# Patient Record
Sex: FEMALE | Race: WHITE | Employment: FULL TIME | URBAN - METROPOLITAN AREA
[De-identification: names, ages, dates, MRNs, and addresses within clinical notes are randomized per-mention and may not be internally consistent; named-entity substitution may affect disease eponyms.]

---

## 2018-04-06 ENCOUNTER — HOSPITAL ENCOUNTER (EMERGENCY)
Age: 48
Discharge: HOME | End: 2018-04-06
Attending: EMERGENCY MEDICINE
Payer: COMMERCIAL

## 2018-04-06 VITALS
HEART RATE: 58 BPM | DIASTOLIC BLOOD PRESSURE: 72 MMHG | OXYGEN SATURATION: 98 % | SYSTOLIC BLOOD PRESSURE: 127 MMHG | RESPIRATION RATE: 16 BRPM

## 2018-04-06 DIAGNOSIS — R07.9 CHEST PAIN, UNSPECIFIED TYPE: Primary | ICD-10-CM

## 2018-04-06 LAB
TROPONIN I BLD-MCNC: 0 NG/ML (ref 0.02–0.05)
TROPONIN I BLD-MCNC: 0 NG/ML (ref 0.02–0.05)

## 2018-04-06 PROCEDURE — 99285 EMERGENCY DEPT VISIT HI MDM: CPT | Performed by: EMERGENCY MEDICINE

## 2018-04-06 PROCEDURE — 74011250637 HC RX REV CODE- 250/637: Performed by: EMERGENCY MEDICINE

## 2018-04-06 PROCEDURE — 93005 ELECTROCARDIOGRAM TRACING: CPT | Performed by: EMERGENCY MEDICINE

## 2018-04-06 PROCEDURE — 84484 ASSAY OF TROPONIN QUANT: CPT

## 2018-04-06 RX ORDER — PHENOL/SODIUM PHENOLATE
20 AEROSOL, SPRAY (ML) MUCOUS MEMBRANE 2 TIMES DAILY
Qty: 60 TAB | Refills: 0 | Status: SHIPPED | OUTPATIENT
Start: 2018-04-06

## 2018-04-06 RX ORDER — GUAIFENESIN 100 MG/5ML
324 LIQUID (ML) ORAL
Status: COMPLETED | OUTPATIENT
Start: 2018-04-06 | End: 2018-04-06

## 2018-04-06 RX ADMIN — Medication 30 ML: at 14:11

## 2018-04-06 RX ADMIN — ASPIRIN 81 MG 324 MG: 81 TABLET ORAL at 14:17

## 2018-04-06 NOTE — ED PROVIDER NOTES
HPI Comments: 55-year-old female presents to the emergency department with chest pain and left shoulder. Started after eating corn dogs for lunch. No alleviating. No aggravating. No shortness of breath no nausea or vomiting    No fever. No abdomen or back pain. No dysuria. Patient had laparoscopic cholecystectomy several years ago    Patient is a 53 y.o. female presenting with chest pain. Chest Pain (Angina)    Pertinent negatives include no fever, no nausea, no shortness of breath and no vomiting. Past Medical History:   Diagnosis Date    Arrhythmia     occ pvc's and pvc's, systolic murmur    Arthritis     r.a. --dx at age 7    CAD (Coronary Artery Disease)     mvp--- with murmur- last echo     Hypertension     labile hypertension    IBS (Irritable Bowel Syndrome)     diarrhea    Irritable Bowel Syndrome with Diarrhea     Migraines     treats with procardia    Other Ill-Defined Conditions     h/o migraines       Past Surgical History:   Procedure Laterality Date    HX  SECTION      HX HEENT      wisdom teeth removed    HX HEENT      lasik eye- bilat    HX TONSILLECTOMY      HX TUBAL LIGATION      and reversal         Family History:   Problem Relation Age of Onset    Hypertension Maternal Grandmother     Heart Disease Paternal Grandmother     Stroke Paternal Grandmother        Social History     Social History    Marital status:      Spouse name: N/A    Number of children: N/A    Years of education: N/A     Occupational History    Not on file.      Social History Main Topics    Smoking status: Former Smoker     Packs/day: 2.00     Years: 15.00    Smokeless tobacco: Not on file      Comment: quit 12 yrs ago    Alcohol use Yes      Comment: rare    Drug use: No    Sexual activity: Not on file     Other Topics Concern    Not on file     Social History Narrative    No narrative on file         ALLERGIES: Adhesive tape    Review of Systems   Constitutional: Negative for activity change, chills and fever. HENT: Negative. Eyes: Negative. Respiratory: Negative for shortness of breath. Cardiovascular: Positive for chest pain. Gastrointestinal: Negative for nausea and vomiting. Genitourinary: Negative. Musculoskeletal: Negative. Skin: Negative. Neurological: Negative. Psychiatric/Behavioral: Negative. Vitals:    18 1339   BP: 157/74   Pulse: 62   Resp: 16   SpO2: 98%            Physical Exam   Constitutional: She is oriented to person, place, and time. She appears well-developed and well-nourished. No distress. HENT:   Head: Atraumatic. Eyes: EOM are normal. Pupils are equal, round, and reactive to light. Cardiovascular: Normal rate and regular rhythm. Pulmonary/Chest: Breath sounds normal. No respiratory distress. She has no wheezes. Abdominal: Soft. She exhibits no distension. There is no tenderness. Musculoskeletal: Normal range of motion. Neurological: She is alert and oriented to person, place, and time. Skin: Skin is dry. Psychiatric: She has a normal mood and affect. Her behavior is normal.   Nursing note and vitals reviewed. MDM  Number of Diagnoses or Management Options  Chest pain, unspecified type:   Diagnosis management comments: Well-appearing 67-year-old female presents with chest pain and some pain in her shoulder    No diaphoresis. No shortness of breath. No nausea    She is not a smoker. Not diabetic. No history of coronary disease    HEART Score Criteria:  History:  0  Highly suspicious           2  Moderately suspicious         1  Slightly suspicious           0  EC  Significant ST-depression                          2  Nonspecific repolarization disturbance  1  Normal      0  Age:  3 > 72years old             3 44-72 years old  3 < 39years old   0  Risk Factors: 0  Risk factors: hypercholesterolemia, hypertension, diabetes, cigarette smoking, family history, obesity.   3 or more risk factors   2  1-2 risk factors  1  No risk factors   0  Troponin: 0  > 3x normal limit  2  1-3x normal limit  1  < normal limit   0  Total HEART Score: 1    Score: 0-3 Low risk           4-6 Moderate risk                 7-10 high risk   Applying the HEART score criteria above, the patient is determined to have a low risk for a major cardiac event within the next 6 weeks, and therefore will be discharged. Patient with very low risk story for cardiac disease. EKG negative. Troponin negative    We will get a 2 hour troponin and EKG. As long as those are negative she'll go home    Patient appears well    Start her on a PPI. Refer to primary care. Yassine Rashid MD; 4/6/2018 @2:58 PM===========================================         Amount and/or Complexity of Data Reviewed  Clinical lab tests: reviewed          ED Course   4:36 PM  I followed up on patient for Dr. Lashawn Mayfield after shift change. Her repeat troponin was 0 and EKG remain normal.  She will be discharged to home with his previous instructions.     Procedures

## 2018-04-06 NOTE — DISCHARGE INSTRUCTIONS
Chest Pain: Care Instructions  Your Care Instructions    There are many things that can cause chest pain. Some are not serious and will get better on their own in a few days. But some kinds of chest pain need more testing and treatment. Your doctor may have recommended a follow-up visit in the next 8 to 12 hours. If you are not getting better, you may need more tests or treatment. Even though your doctor has released you, you still need to watch for any problems. The doctor carefully checked you, but sometimes problems can develop later. If you have new symptoms or if your symptoms do not get better, get medical care right away. If you have worse or different chest pain or pressure that lasts more than 5 minutes or you passed out (lost consciousness), call 911 or seek other emergency help right away. A medical visit is only one step in your treatment. Even if you feel better, you still need to do what your doctor recommends, such as going to all suggested follow-up appointments and taking medicines exactly as directed. This will help you recover and help prevent future problems. How can you care for yourself at home? · Rest until you feel better. · Take your medicine exactly as prescribed. Call your doctor if you think you are having a problem with your medicine. · Do not drive after taking a prescription pain medicine. When should you call for help? Call 911 if:  ? · You passed out (lost consciousness). ? · You have severe difficulty breathing. ? · You have symptoms of a heart attack. These may include:  ¨ Chest pain or pressure, or a strange feeling in your chest.  ¨ Sweating. ¨ Shortness of breath. ¨ Nausea or vomiting. ¨ Pain, pressure, or a strange feeling in your back, neck, jaw, or upper belly or in one or both shoulders or arms. ¨ Lightheadedness or sudden weakness. ¨ A fast or irregular heartbeat.   After you call 911, the  may tell you to chew 1 adult-strength or 2 to 4 low-dose aspirin. Wait for an ambulance. Do not try to drive yourself. ?Call your doctor today if:  ? · You have any trouble breathing. ? · Your chest pain gets worse. ? · You are dizzy or lightheaded, or you feel like you may faint. ? · You are not getting better as expected. ? · You are having new or different chest pain. Where can you learn more? Go to http://reymundo-marcelo.info/. Enter A120 in the search box to learn more about \"Chest Pain: Care Instructions. \"  Current as of: March 20, 2017  Content Version: 11.4  © 8158-8784 [employer] Care instructions adapted under license by [employer] (which disclaims liability or warranty for this information). If you have questions about a medical condition or this instruction, always ask your healthcare professional. [address] any warranty or liability for your use of this information.

## 2018-04-06 NOTE — ED NOTES
I have reviewed discharge instructions with the patient. The patient verbalized understanding. Patient left ED via Discharge Method: wheelchair to Home with self    Opportunity for questions and clarification provided. Patient given 0 scripts. To continue your aftercare when you leave the hospital, you may receive an automated call from our care team to check in on how you are doing. This is a free service and part of our promise to provide the best care and service to meet your aftercare needs.  If you have questions, or wish to unsubscribe from this service please call [phone]. Thank you for Choosing our [facility] Emergency Department.

## 2018-04-07 LAB
ATRIAL RATE: 52 BPM
ATRIAL RATE: 63 BPM
CALCULATED P AXIS, ECG09: 59 DEGREES
CALCULATED P AXIS, ECG09: 70 DEGREES
CALCULATED R AXIS, ECG10: 83 DEGREES
CALCULATED R AXIS, ECG10: 83 DEGREES
CALCULATED T AXIS, ECG11: 67 DEGREES
CALCULATED T AXIS, ECG11: 70 DEGREES
DIAGNOSIS, 93000: NORMAL
DIAGNOSIS, 93000: NORMAL
P-R INTERVAL, ECG05: 166 MS
P-R INTERVAL, ECG05: 170 MS
Q-T INTERVAL, ECG07: 400 MS
Q-T INTERVAL, ECG07: 434 MS
QRS DURATION, ECG06: 80 MS
QRS DURATION, ECG06: 80 MS
QTC CALCULATION (BEZET), ECG08: 403 MS
QTC CALCULATION (BEZET), ECG08: 409 MS
VENTRICULAR RATE, ECG03: 52 BPM
VENTRICULAR RATE, ECG03: 63 BPM

## 2021-04-20 PROCEDURE — 88305 TISSUE EXAM BY PATHOLOGIST: CPT

## 2021-04-20 PROCEDURE — 88312 SPECIAL STAINS GROUP 1: CPT

## 2021-04-21 ENCOUNTER — HOSPITAL ENCOUNTER (OUTPATIENT)
Dept: LAB | Age: 51
Discharge: HOME | End: 2021-04-21

## 2022-07-19 LAB
CHOLEST SERPL-MCNC: 157 MG/DL
GLUCOSE SERPL-MCNC: 128 MG/DL (ref 65–100)
HDLC SERPL-MCNC: 52 MG/DL (ref 40–60)
LDLC SERPL CALC-MCNC: 70 MG/DL
TRIGL SERPL-MCNC: 175 MG/DL (ref 35–150)

## 2023-06-22 ENCOUNTER — HOSPITAL ENCOUNTER (OUTPATIENT)
Dept: PHYSICAL THERAPY | Age: 53
Setting detail: RECURRING SERIES
Discharge: HOME | End: 2023-06-25
Payer: COMMERCIAL

## 2023-06-22 PROCEDURE — 97161 PT EVAL LOW COMPLEX 20 MIN: CPT

## 2023-06-22 ASSESSMENT — PAIN SCALES - GENERAL: PAINLEVEL_OUTOF10: 3

## 2023-06-30 ENCOUNTER — HOSPITAL ENCOUNTER (OUTPATIENT)
Dept: PHYSICAL THERAPY | Age: 53
Setting detail: RECURRING SERIES
End: 2023-06-30
Payer: COMMERCIAL

## 2023-06-30 PROCEDURE — 97140 MANUAL THERAPY 1/> REGIONS: CPT

## 2023-06-30 PROCEDURE — 97110 THERAPEUTIC EXERCISES: CPT

## 2023-06-30 ASSESSMENT — PAIN SCALES - GENERAL: PAINLEVEL_OUTOF10: 3

## 2023-07-07 ENCOUNTER — HOSPITAL ENCOUNTER (OUTPATIENT)
Dept: PHYSICAL THERAPY | Age: 53
Setting detail: RECURRING SERIES
Discharge: HOME | End: 2023-07-10
Payer: COMMERCIAL

## 2023-07-07 PROCEDURE — 97530 THERAPEUTIC ACTIVITIES: CPT

## 2023-07-07 PROCEDURE — 97110 THERAPEUTIC EXERCISES: CPT

## 2023-07-07 PROCEDURE — 97140 MANUAL THERAPY 1/> REGIONS: CPT

## 2023-07-07 ASSESSMENT — PAIN SCALES - GENERAL: PAINLEVEL_OUTOF10: 4

## 2023-07-14 ENCOUNTER — HOSPITAL ENCOUNTER (OUTPATIENT)
Dept: PHYSICAL THERAPY | Age: 53
Setting detail: RECURRING SERIES
Discharge: HOME | End: 2023-07-17
Payer: COMMERCIAL

## 2023-07-14 PROCEDURE — 97140 MANUAL THERAPY 1/> REGIONS: CPT

## 2023-07-14 ASSESSMENT — PAIN SCALES - GENERAL: PAINLEVEL_OUTOF10: 3

## 2023-07-21 ENCOUNTER — HOSPITAL ENCOUNTER (OUTPATIENT)
Dept: PHYSICAL THERAPY | Age: 53
Setting detail: RECURRING SERIES
Discharge: HOME | End: 2023-07-24
Payer: COMMERCIAL

## 2023-07-21 PROCEDURE — 97140 MANUAL THERAPY 1/> REGIONS: CPT

## 2023-07-21 ASSESSMENT — PAIN SCALES - GENERAL: PAINLEVEL_OUTOF10: 2

## 2023-07-21 NOTE — PROGRESS NOTES
Lourdes Mckeon  : 1970  Primary: Susie Reagan ([facility])  Secondary:  [address] @ [employer]  [address]   [address] [facility] [address]  Phone: [phone]  Fax: [phone] Plan Frequency: 1 time a week for 4 weeks then every other week as needed for 8 weeks  Plan of Care/Certification Expiration Date: 23      >PT Visit Info:  Plan Frequency: 1 time a week for 4 weeks then every other week as needed for 8 weeks  Plan of Care/Certification Expiration Date: 23      Visit Count:  5    OUTPATIENT PHYSICAL THERAPY:OP NOTE TYPE: Treatment Note 2023       Episode  }Appt Desk             Treatment Diagnosis:  Cervicalgia (M54.2)  Arthralgia of temporomandibular joint, M26.61  Medical/Referring Diagnosis:  Unspecified temporomandibular joint disorder, unspecified side [M26.609]  Referring Physician:  DANIE Toure NP, MD Orders:  PT Eval and Treat   Date of Onset:  Onset Date:  (greater than 1 year)     Allergies:   Adhesive tape  Restrictions/Precautions:  No data recordedNo data recorded     Interventions Planned (Treatment may consist of any combination of the following):    Current Treatment Recommendations: Strengthening; ROM; Neuromuscular re-education; Manual; Modalities; Dry needling     >Subjective Comments:Patient reports that she has not worn the splint and feels a little better this week with less popping. She did have a fatigue feeling after eating a burrito the other day  >Initial:     2/10>Post Session:       1/10  Medications Last Reviewed:  2023  Updated Objective Findings:    Treatment   THERAPEUTIC EXERCISE: (0 minutes):    Exercises per grid below to improve mobility, strength, and coordination. Required minimal visual, verbal, manual, and tactile cues to promote proper body alignment, promote proper body posture, and promote proper body mechanics. Progressed resistance, range, and repetitions as indicated.    Date:  23

## 2023-08-07 ENCOUNTER — HOSPITAL ENCOUNTER (OUTPATIENT)
Dept: PHYSICAL THERAPY | Age: 53
Setting detail: RECURRING SERIES
Discharge: HOME | End: 2023-08-10
Payer: COMMERCIAL

## 2023-08-07 PROCEDURE — 97140 MANUAL THERAPY 1/> REGIONS: CPT

## 2023-08-07 ASSESSMENT — PAIN SCALES - GENERAL: PAINLEVEL_OUTOF10: 2

## 2023-08-07 NOTE — PROGRESS NOTES
Ann-Marie Randy  : 1970  Primary: Conrad Will Sc ([facility])  Secondary:  [address] @ [employer]  [address]   [address] [facility] [address]  Phone: [phone]  Fax: [phone] Plan Frequency: 1 time a week for 4 weeks then every other week as needed for 8 weeks  Plan of Care/Certification Expiration Date: 23      >PT Visit Info:  Plan Frequency: 1 time a week for 4 weeks then every other week as needed for 8 weeks  Plan of Care/Certification Expiration Date: 23      Visit Count:  6    OUTPATIENT PHYSICAL THERAPY:OP NOTE TYPE: Treatment Note 2023       Episode  }Appt Desk             Treatment Diagnosis:  Cervicalgia (M54.2)  Arthralgia of temporomandibular joint, M26.61  Medical/Referring Diagnosis:  Unspecified temporomandibular joint disorder, unspecified side [M26.609]  Referring Physician:  DANIE Villarreal NP, MD Orders:  PT Eval and Treat   Date of Onset:  Onset Date:  (greater than 1 year)     Allergies:   Adhesive tape  Restrictions/Precautions:  No data recordedNo data recorded     Interventions Planned (Treatment may consist of any combination of the following):    Current Treatment Recommendations: Strengthening; ROM; Neuromuscular re-education; Manual; Modalities; Dry needling     >Subjective Comments:Patient reports that her jaw was doing good till she got a baguette sandwich last week and was locked for about 5 days. It has calmed down now, but was stuck for a little bit  >Initial:     2/10>Post Session:       1/10  Medications Last Reviewed:  2023  Updated Objective Findings:  More symmetrical opening today  Treatment   THERAPEUTIC EXERCISE: (0 minutes):    Exercises per grid below to improve mobility, strength, and coordination. Required minimal visual, verbal, manual, and tactile cues to promote proper body alignment, promote proper body posture, and promote proper body mechanics.   Progressed resistance, range, and repetitions as

## 2023-08-25 ENCOUNTER — HOSPITAL ENCOUNTER (OUTPATIENT)
Dept: PHYSICAL THERAPY | Age: 53
Setting detail: RECURRING SERIES
Discharge: HOME | End: 2023-08-28
Payer: COMMERCIAL

## 2023-08-25 PROCEDURE — 97110 THERAPEUTIC EXERCISES: CPT

## 2023-08-25 PROCEDURE — 97140 MANUAL THERAPY 1/> REGIONS: CPT

## 2023-08-25 ASSESSMENT — PAIN SCALES - GENERAL: PAINLEVEL_OUTOF10: 0

## 2023-08-25 NOTE — PROGRESS NOTES
Talisha Perez  : 1970  Primary: Sarita  Sc ([facility])  Secondary:  [address] [employer]  [address]   [address] [facility] [address]  Phone: [phone]  Fax: [phone] Plan Frequency: 1 time a week for 4 weeks then every other week as needed for 8 weeks  Plan of Care/Certification Expiration Date: 23      >PT Visit Info:  Plan Frequency: 1 time a week for 4 weeks then every other week as needed for 8 weeks  Plan of Care/Certification Expiration Date: 23      Visit Count:  7    OUTPATIENT PHYSICAL THERAPY:OP NOTE TYPE: Treatment Note 2023       Episode  }Appt Desk             Treatment Diagnosis:  Cervicalgia (M54.2)  Arthralgia of temporomandibular joint, M26.61  Medical/Referring Diagnosis:  Unspecified temporomandibular joint disorder, unspecified side [M26.609]  Referring Physician:  DANIE Cates NP, MD Orders:  PT Eval and Treat   Date of Onset:  Onset Date:  (greater than 1 year)     Allergies:   Adhesive tape  Restrictions/Precautions:  No data recordedNo data recorded     Interventions Planned (Treatment may consist of any combination of the following):    Current Treatment Recommendations: Strengthening; ROM; Neuromuscular re-education; Manual; Modalities; Dry needling     >Subjective Comments:Patient reports that she is doing pretty good and managing to not do too much with bigger bites of food. She feels like it is progressing, but really slowly. Less popping lately  >Initial:     0/10>Post Session:       0/10  Medications Last Reviewed:  2023  Updated Objective Findings:  More symmetrical opening today  Treatment   THERAPEUTIC EXERCISE: (10 minutes):    Exercises per grid below to improve mobility, strength, and coordination. Required minimal visual, verbal, manual, and tactile cues to promote proper body alignment, promote proper body posture, and promote proper body mechanics.   Progressed resistance, range, and repetitions as

## 2023-09-20 ENCOUNTER — HOSPITAL ENCOUNTER (OUTPATIENT)
Dept: PHYSICAL THERAPY | Age: 53
Setting detail: RECURRING SERIES
Discharge: HOME | End: 2023-09-23
Payer: COMMERCIAL

## 2023-09-20 PROCEDURE — 97140 MANUAL THERAPY 1/> REGIONS: CPT

## 2023-09-20 ASSESSMENT — PAIN SCALES - GENERAL: PAINLEVEL_OUTOF10: 0

## 2023-09-20 NOTE — THERAPY DISCHARGE
Robyn Castorena  : 1970  Primary: Santosh Jessica Sc ([facility])  Secondary:  [address] [employer]  [address]   [address] [facility] [address]  Phone: [phone]  Fax: [phone] Plan Frequency: 1 time a week for 4 weeks then every other week as needed for 8 weeks    Plan of Care/Certification Expiration Date: 23      PT Visit Info:  Plan Frequency: 1 time a week for 4 weeks then every other week as needed for 8 weeks  Plan of Care/Certification Expiration Date: 23      Visit Count:  8                OUTPATIENT PHYSICAL THERAPY:             OP NOTE TYPE: Discharge Summary 2023               Episode (TMD) Appt Desk         Treatment Diagnosis:  Cervicalgia (M54.2)  Arthralgia of temporomandibular joint, M26.61  Medical/Referring Diagnosis:  Unspecified temporomandibular joint disorder, unspecified side [M26.609]  Referring Physician:  DANIE Uriarte NP, MD Orders:  PT Eval and Treat   Return MD Appt:  not scheduled  Date of Onset:  Onset Date:  (greater than 1 year)      Allergies:  Adhesive tape  Restrictions/Precautions:           Medications Last Reviewed:  2023     SUBJECTIVE   History of Injury/Illness (Reason for Referral):  Patient reports that her jaw pain started over a year ago. She started to feel pain and discomfort with yawning or chewing or talking sometimes. The pain feels stiff in the mornings and more with teeth brushing. She has felt the right jaw get stuck a few times over the last month or two with eating. She does have a history of migraines.  She has an over the counter  for grinding teeth that did help for a while  Patient Stated Goal(s):  \"to fix her jaw and not have it lock\"  Initial:     010 Post Session:     010  Past Medical History/Comorbidities:   Ms. Yusuf Gamboa  has a past medical history of Arrhythmia, Arthritis, CAD (coronary artery disease), Hypertension, IBS (irritable bowel syndrome), Irritable bowel

## 2023-09-20 NOTE — PROGRESS NOTES
Lourdes Mckeon  : 1970  Primary: Susie Reagan ([facility])  Secondary:  [address] [employer]  [address]   [address] [facility] [address]  Phone: [phone]  Fax: [phone] Plan Frequency: 1 time a week for 4 weeks then every other week as needed for 8 weeks  Plan of Care/Certification Expiration Date: 23      >PT Visit Info:  Plan Frequency: 1 time a week for 4 weeks then every other week as needed for 8 weeks  Plan of Care/Certification Expiration Date: 23      Visit Count:  8    OUTPATIENT PHYSICAL THERAPY:OP NOTE TYPE: Treatment Note 2023       Episode  }Appt Desk             Treatment Diagnosis:  Cervicalgia (M54.2)  Arthralgia of temporomandibular joint, M26.61  Medical/Referring Diagnosis:  Unspecified temporomandibular joint disorder, unspecified side [M26.609]  Referring Physician:  DANIE Toure NP, MD Orders:  PT Eval and Treat   Date of Onset:  Onset Date:  (greater than 1 year)     Allergies:   Adhesive tape  Restrictions/Precautions:  No data recordedNo data recorded     Interventions Planned (Treatment may consist of any combination of the following):    Current Treatment Recommendations: Strengthening; ROM; Neuromuscular re-education; Manual; Modalities; Dry needling     >Subjective Comments:Patient reports that she is doing well and not thinking about it as much. She feels it sometimes and feels it lock still with bigger bites of food, but can get it back in alignment when needed  >Initial:     0/10>Post Session:       0/10  Medications Last Reviewed:  2023  Updated Objective Findings:  More symmetrical opening today  Treatment   THERAPEUTIC EXERCISE: (0 minutes):    Exercises per grid below to improve mobility, strength, and coordination. Required minimal visual, verbal, manual, and tactile cues to promote proper body alignment, promote proper body posture, and promote proper body mechanics.   Progressed resistance, range, and

## 2024-04-01 ENCOUNTER — TELEPHONE (OUTPATIENT)
Dept: ORTHOPEDIC SURGERY | Age: 54
End: 2024-04-01

## 2024-04-01 NOTE — TELEPHONE ENCOUNTER
Patient is calling about a 2nd opinion about her back.  She has a hemangioma at T10 found by MRI at [facility].  She sees a dr at [facility].  Informed patient to bring her records from [place], any PT or injection records, a copy of the MRI disc to upload with the MRI report so that Dr. Luna can review the records.  I also informed her that once Dr. Luna reviews the records, I will call her back.  Patient stated understanding and will bring the records and drop off at [address] and ask the  to forward to me to send for review with Dr. Luna.

## 2024-04-02 ENCOUNTER — TELEPHONE (OUTPATIENT)
Dept: ORTHOPEDIC SURGERY | Age: 54
End: 2024-04-02

## 2024-04-02 NOTE — TELEPHONE ENCOUNTER
Patient brought in office visit notes and a CD of imaging.  The CD was taken to the XR department to be uploaded in the system, and the records were placed in Dr. Luna's box to be reviewed.  Once the CD and records are brought back to me, I will place up front in the box for the patient to pick them back up to keep for her records.

## 2024-07-10 ENCOUNTER — HOSPITAL ENCOUNTER (OUTPATIENT)
Dept: GENERAL RADIOLOGY | Age: 54
Discharge: HOME | End: 2024-07-13
Payer: COMMERCIAL

## 2024-07-10 ENCOUNTER — OFFICE VISIT (OUTPATIENT)
Dept: RHEUMATOLOGY | Age: 54
End: 2024-07-10
Payer: COMMERCIAL

## 2024-07-10 VITALS
HEIGHT: 65 IN | DIASTOLIC BLOOD PRESSURE: 72 MMHG | WEIGHT: 172.4 LBS | HEART RATE: 55 BPM | SYSTOLIC BLOOD PRESSURE: 130 MMHG | BODY MASS INDEX: 28.72 KG/M2

## 2024-07-10 DIAGNOSIS — L65.9 HAIR LOSS: ICD-10-CM

## 2024-07-10 DIAGNOSIS — R76.8 POSITIVE ANA (ANTINUCLEAR ANTIBODY): Primary | ICD-10-CM

## 2024-07-10 DIAGNOSIS — R76.8 POSITIVE ANA (ANTINUCLEAR ANTIBODY): ICD-10-CM

## 2024-07-10 DIAGNOSIS — M54.6 CHRONIC LEFT-SIDED THORACIC BACK PAIN: ICD-10-CM

## 2024-07-10 DIAGNOSIS — M25.50 ARTHRALGIA, UNSPECIFIED JOINT: ICD-10-CM

## 2024-07-10 DIAGNOSIS — G89.29 CHRONIC LEFT-SIDED THORACIC BACK PAIN: ICD-10-CM

## 2024-07-10 PROBLEM — G43.009 MIGRAINE WITHOUT AURA: Status: ACTIVE | Noted: 2017-08-17

## 2024-07-10 PROBLEM — R31.0 GROSS HEMATURIA: Status: ACTIVE | Noted: 2019-12-09

## 2024-07-10 PROBLEM — R59.0 LYMPHADENOPATHY, SUPRACLAVICULAR: Status: ACTIVE | Noted: 2021-05-01

## 2024-07-10 PROBLEM — R53.82 CHRONIC FATIGUE: Status: ACTIVE | Noted: 2021-09-27

## 2024-07-10 PROBLEM — D32.9 MENINGIOMA (HCC): Status: ACTIVE | Noted: 2017-09-18

## 2024-07-10 PROBLEM — R06.83 SNORING: Status: ACTIVE | Noted: 2021-09-27

## 2024-07-10 PROBLEM — R09.82 POST-NASAL DRIP: Status: ACTIVE | Noted: 2019-09-14

## 2024-07-10 PROBLEM — I10 HTN (HYPERTENSION): Status: ACTIVE | Noted: 2018-04-16

## 2024-07-10 PROBLEM — B00.1 HERPES SIMPLEX LABIALIS: Status: ACTIVE | Noted: 2022-06-02

## 2024-07-10 PROBLEM — R01.1 HEART MURMUR: Status: ACTIVE | Noted: 2018-04-16

## 2024-07-10 PROBLEM — J30.89 NON-SEASONAL ALLERGIC RHINITIS: Status: ACTIVE | Noted: 2020-10-28

## 2024-07-10 PROBLEM — K21.9 GERD (GASTROESOPHAGEAL REFLUX DISEASE): Status: ACTIVE | Noted: 2021-02-10

## 2024-07-10 PROBLEM — R00.2 PALPITATION: Status: ACTIVE | Noted: 2024-07-10

## 2024-07-10 LAB
ALBUMIN SERPL-MCNC: 4.4 G/DL (ref 3.5–5)
ALBUMIN/GLOB SERPL: 1.6 (ref 1–1.9)
ALP SERPL-CCNC: 78 U/L (ref 35–104)
ALT SERPL-CCNC: 34 U/L (ref 12–65)
ANION GAP SERPL CALC-SCNC: 12 MMOL/L (ref 9–18)
APPEARANCE UR: CLEAR
AST SERPL-CCNC: 34 U/L (ref 15–37)
BACTERIA URNS QL MICRO: 0 /HPF
BASOPHILS # BLD: 0 K/UL (ref 0–0.2)
BASOPHILS NFR BLD: 1 % (ref 0–2)
BILIRUB SERPL-MCNC: 1.3 MG/DL (ref 0–1.2)
BILIRUB UR QL: NEGATIVE
BUN SERPL-MCNC: 21 MG/DL (ref 6–23)
CALCIUM SERPL-MCNC: 9.6 MG/DL (ref 8.8–10.2)
CASTS URNS QL MICRO: ABNORMAL /LPF
CHLORIDE SERPL-SCNC: 103 MMOL/L (ref 98–107)
CO2 SERPL-SCNC: 27 MMOL/L (ref 20–28)
COLOR UR: ABNORMAL
CREAT SERPL-MCNC: 0.59 MG/DL (ref 0.6–1.1)
CREAT UR-MCNC: 129 MG/DL (ref 28–217)
CRYSTALS URNS QL MICRO: ABNORMAL /LPF
DIFFERENTIAL METHOD BLD: NORMAL
EOSINOPHIL # BLD: 0.1 K/UL (ref 0–0.8)
EOSINOPHIL NFR BLD: 2 % (ref 0.5–7.8)
EPI CELLS #/AREA URNS HPF: ABNORMAL /HPF
ERYTHROCYTE [DISTWIDTH] IN BLOOD BY AUTOMATED COUNT: 12.4 % (ref 11.9–14.6)
ERYTHROCYTE [SEDIMENTATION RATE] IN BLOOD: 3 MM/HR (ref 0–30)
GLOBULIN SER CALC-MCNC: 2.8 G/DL (ref 2.3–3.5)
GLUCOSE SERPL-MCNC: 105 MG/DL (ref 70–99)
GLUCOSE UR STRIP.AUTO-MCNC: NEGATIVE MG/DL
HCT VFR BLD AUTO: 42.5 % (ref 35.8–46.3)
HGB BLD-MCNC: 13.4 G/DL (ref 11.7–15.4)
HGB UR QL STRIP: NEGATIVE
IMM GRANULOCYTES # BLD AUTO: 0 K/UL (ref 0–0.5)
IMM GRANULOCYTES NFR BLD AUTO: 0 % (ref 0–5)
KETONES UR QL STRIP.AUTO: NEGATIVE MG/DL
LEUKOCYTE ESTERASE UR QL STRIP.AUTO: ABNORMAL
LYMPHOCYTES # BLD: 2.2 K/UL (ref 0.5–4.6)
LYMPHOCYTES NFR BLD: 33 % (ref 13–44)
MCH RBC QN AUTO: 30 PG (ref 26.1–32.9)
MCHC RBC AUTO-ENTMCNC: 31.5 G/DL (ref 31.4–35)
MCV RBC AUTO: 95.3 FL (ref 82–102)
MONOCYTES # BLD: 0.6 K/UL (ref 0.1–1.3)
MONOCYTES NFR BLD: 9 % (ref 4–12)
NEUTS SEG # BLD: 3.7 K/UL (ref 1.7–8.2)
NEUTS SEG NFR BLD: 55 % (ref 43–78)
NITRITE UR QL STRIP.AUTO: NEGATIVE
NRBC # BLD: 0 K/UL (ref 0–0.2)
PH UR STRIP: 6.5 (ref 5–9)
PLATELET # BLD AUTO: 200 K/UL (ref 150–450)
PMV BLD AUTO: 11.6 FL (ref 9.4–12.3)
POTASSIUM SERPL-SCNC: 3.6 MMOL/L (ref 3.5–5.1)
PROT SERPL-MCNC: 7.2 G/DL (ref 6.3–8.2)
PROT UR STRIP-MCNC: NEGATIVE MG/DL
PROT UR-MCNC: 10 MG/DL
PROT/CREAT UR-RTO: 0.1
RBC # BLD AUTO: 4.46 M/UL (ref 4.05–5.2)
RBC #/AREA URNS HPF: ABNORMAL /HPF
SODIUM SERPL-SCNC: 141 MMOL/L (ref 136–145)
SP GR UR REFRACTOMETRY: 1.02 (ref 1–1.02)
UROBILINOGEN UR QL STRIP.AUTO: 0.2 EU/DL (ref 0.2–1)
WBC # BLD AUTO: 6.6 K/UL (ref 4.3–11.1)
WBC URNS QL MICRO: ABNORMAL /HPF

## 2024-07-10 PROCEDURE — 3075F SYST BP GE 130 - 139MM HG: CPT | Performed by: INTERNAL MEDICINE

## 2024-07-10 PROCEDURE — 73070 X-RAY EXAM OF ELBOW: CPT

## 2024-07-10 PROCEDURE — 73130 X-RAY EXAM OF HAND: CPT

## 2024-07-10 PROCEDURE — 73564 X-RAY EXAM KNEE 4 OR MORE: CPT

## 2024-07-10 PROCEDURE — 99205 OFFICE O/P NEW HI 60 MIN: CPT | Performed by: INTERNAL MEDICINE

## 2024-07-10 PROCEDURE — 3078F DIAST BP <80 MM HG: CPT | Performed by: INTERNAL MEDICINE

## 2024-07-10 RX ORDER — FINASTERIDE 5 MG/1
2.5 TABLET, FILM COATED ORAL DAILY
COMMUNITY
Start: 2024-06-26

## 2024-07-10 RX ORDER — ROSUVASTATIN CALCIUM 5 MG/1
1 TABLET, COATED ORAL
COMMUNITY

## 2024-07-10 RX ORDER — RIMEGEPANT SULFATE 75 MG/75MG
75 TABLET, ORALLY DISINTEGRATING ORAL PRN
COMMUNITY

## 2024-07-10 RX ORDER — PROPRANOLOL HCL 60 MG
60 CAPSULE, EXTENDED RELEASE 24HR ORAL DAILY
COMMUNITY
Start: 2023-05-18

## 2024-07-10 RX ORDER — METHOCARBAMOL 750 MG/1
750 TABLET, FILM COATED ORAL DAILY
COMMUNITY
Start: 2024-05-01

## 2024-07-10 RX ORDER — CELECOXIB 200 MG/1
200 CAPSULE ORAL DAILY
Qty: 30 CAPSULE | Refills: 0 | Status: SHIPPED | OUTPATIENT
Start: 2024-07-10 | End: 2024-08-09

## 2024-07-10 RX ORDER — FLUTICASONE PROPIONATE 50 MCG
1 SPRAY, SUSPENSION (ML) NASAL DAILY
COMMUNITY

## 2024-07-10 RX ORDER — MINOXIDIL 2.5 MG/1
2.5 TABLET ORAL DAILY
COMMUNITY
Start: 2024-06-26

## 2024-07-10 ASSESSMENT — JOINT PAIN
TOTAL NUMBER OF TENDER JOINTS: 0
TOTAL NUMBER OF SWOLLEN JOINTS: 0

## 2024-07-10 NOTE — PATIENT INSTRUCTIONS
Get labs and x-rays done today  Working up diagnosis of lupus  Start taking celebrex 200mg daily as needed  Follow-up in 4 weeks

## 2024-07-10 NOTE — PROGRESS NOTES
VIEWS); Future  -     XR Knee Bilateral Standard Extended VW; Future  4. Chronic left-sided thoracic back pain  Overview:  Severe muscle spasms along lower thoracic vertebrae of unclear etiology  Was following with KERRY Lipscomb and underwent T10 kyphoplasty with osteocool in 5/2024 because of a T10 hemangioma which was thought to be causing the pain. Still has pain despite the procedure but overall pain has improved with use of muscle relaxer.    Plan:  -continue f/u with Dr. Lipscomb  -continue robaxin 750mg qd per prescribing provider        Return in about 4 weeks (around 8/7/2024) for follow up.     Patient Instructions   Get labs and x-rays done today  Working up diagnosis of lupus  Start taking celebrex 200mg daily as needed  Follow-up in 4 weeks        I appreciate the consult and the opportunity to participate in the care of this patient.      Electronically signed by:  Beau Collado,       This note was dictated using dragon voice recognition software.  It has been proofread, but there may still exist voice recognition errors that the author did not detect.    Total time spent on this encounter was 60-74 minutes independently interpreting results, ordering medication & tests, performing examination, documentation, and in care coordination.       ---------------------------------------------------------------------------------------------------------------------------------------------------------------------------------------------------------------------------------

## 2024-07-11 ENCOUNTER — TELEPHONE (OUTPATIENT)
Dept: RHEUMATOLOGY | Age: 54
End: 2024-07-11

## 2024-07-11 DIAGNOSIS — M25.521 RIGHT ELBOW PAIN: Primary | ICD-10-CM

## 2024-07-11 DIAGNOSIS — M25.421 ELBOW JOINT EFFUSION, RIGHT: ICD-10-CM

## 2024-07-11 LAB
CENTROMERE B AB SER-ACNC: <0.2 AI (ref 0–0.9)
CHROMATIN AB SERPL-ACNC: <0.2 AI (ref 0–0.9)
DSDNA AB SER-ACNC: <1 IU/ML (ref 0–9)
ENA JO1 AB SER-ACNC: <0.2 AI (ref 0–0.9)
ENA RNP AB SER-ACNC: 0.2 AI (ref 0–0.9)
ENA SCL70 AB SER-ACNC: 0.5 AI (ref 0–0.9)
ENA SM AB SER-ACNC: <0.2 AI (ref 0–0.9)
ENA SS-A AB SER-ACNC: <0.2 AI (ref 0–0.9)
ENA SS-B AB SER-ACNC: <0.2 AI (ref 0–0.9)
Lab: NORMAL

## 2024-07-11 NOTE — TELEPHONE ENCOUNTER
----- Message from Beau Collado DO sent at 7/11/2024 11:37 AM EDT -----  Hi Please call the patient and inform her that the right elbow joint shows that she has an effusion (fluid in the joint) and possibly a fracture. The radiologist recommends getting an MRI to further evaluate. This has been ordered and she should be called to schedule this. I have also referred her to orthopedic surgery to further evaluate this.

## 2024-07-12 LAB
C3 SERPL-MCNC: 155 MG/DL (ref 82–167)
C4 SERPL-MCNC: 24 MG/DL (ref 12–38)
CRP SERPL-MCNC: <1 MG/L (ref 0–10)

## 2024-07-20 LAB
APTT HEX PL PPP: 6 SEC
APTT IMM NP PPP: NORMAL SEC
APTT PPP 1:1 SALINE: NORMAL SEC
APTT PPP: 29.5 SEC
B2 GLYCOPROT1 IGA SER-ACNC: <10 SAU
B2 GLYCOPROT1 IGG SER-ACNC: <10 SGU
B2 GLYCOPROT1 IGM SER-ACNC: <10 SMU
CARDIOLIPIN IGG SER IA-ACNC: <10 GPL
CARDIOLIPIN IGM SER IA-ACNC: <10 MPL
CONFIRM APTT: 1.7 SEC
CONFIRM DRVVT: NORMAL SEC
INR PPP: 1 RATIO
LABORATORY COMMENT REPORT: NORMAL
PROTHROMBIN TIME: 10.7 SEC
SCREEN DRVVT/NORMAL: NORMAL RATIO
SCREEN DRVVT: 35.4 SEC
THROMBIN TIME: 17.9 SEC

## 2024-07-22 ENCOUNTER — HOSPITAL ENCOUNTER (OUTPATIENT)
Dept: MRI IMAGING | Age: 54
Discharge: HOME | End: 2024-07-25
Attending: INTERNAL MEDICINE
Payer: COMMERCIAL

## 2024-07-22 DIAGNOSIS — M25.421 ELBOW JOINT EFFUSION, RIGHT: ICD-10-CM

## 2024-07-22 DIAGNOSIS — M25.521 RIGHT ELBOW PAIN: ICD-10-CM

## 2024-07-22 PROCEDURE — 73221 MRI JOINT UPR EXTREM W/O DYE: CPT

## 2024-07-24 LAB
CHOLEST SERPL-MCNC: 177 MG/DL (ref 0–200)
GLUCOSE SERPL-MCNC: 97 MG/DL (ref 70–99)
HDLC SERPL-MCNC: 59 MG/DL (ref 40–60)
LDLC SERPL CALC-MCNC: 89 MG/DL (ref 0–100)
TRIGL SERPL-MCNC: 147 MG/DL (ref 0–150)

## 2024-08-13 ENCOUNTER — OFFICE VISIT (OUTPATIENT)
Dept: RHEUMATOLOGY | Age: 54
End: 2024-08-13
Payer: COMMERCIAL

## 2024-08-13 VITALS
HEART RATE: 53 BPM | SYSTOLIC BLOOD PRESSURE: 130 MMHG | BODY MASS INDEX: 28.49 KG/M2 | DIASTOLIC BLOOD PRESSURE: 77 MMHG | WEIGHT: 171 LBS | HEIGHT: 65 IN | TEMPERATURE: 97.8 F

## 2024-08-13 DIAGNOSIS — M54.6 CHRONIC LEFT-SIDED THORACIC BACK PAIN: ICD-10-CM

## 2024-08-13 DIAGNOSIS — R53.82 CHRONIC FATIGUE: ICD-10-CM

## 2024-08-13 DIAGNOSIS — R76.8 POSITIVE ANA (ANTINUCLEAR ANTIBODY): Primary | ICD-10-CM

## 2024-08-13 DIAGNOSIS — M25.50 ARTHRALGIA, UNSPECIFIED JOINT: ICD-10-CM

## 2024-08-13 DIAGNOSIS — G89.29 CHRONIC LEFT-SIDED THORACIC BACK PAIN: ICD-10-CM

## 2024-08-13 DIAGNOSIS — L65.9 HAIR LOSS: ICD-10-CM

## 2024-08-13 PROCEDURE — 99214 OFFICE O/P EST MOD 30 MIN: CPT | Performed by: INTERNAL MEDICINE

## 2024-08-13 PROCEDURE — 3075F SYST BP GE 130 - 139MM HG: CPT | Performed by: INTERNAL MEDICINE

## 2024-08-13 PROCEDURE — 3078F DIAST BP <80 MM HG: CPT | Performed by: INTERNAL MEDICINE

## 2024-08-13 ASSESSMENT — ROUTINE ASSESSMENT OF PATIENT INDEX DATA (RAPID3)
ON A SCALE OF ONE TO TEN, HOW MUCH OF A PROBLEM HAS UNUSUAL FATIGUE OR TIREDNESS BEEN FOR YOU OVER THE PAST WEEK?: 1
ON A SCALE OF ONE TO TEN, CONSIDERING ALL THE WAYS IN WHICH ILLNESS AND HEALTH CONDITIONS MAY AFFECT YOU AT THIS TIME, PLEASE INDICATE BELOW HOW YOU ARE DOING:: 3
ON A SCALE OF ONE TO TEN, HOW MUCH PAIN HAVE YOU HAD BECAUSE OF YOUR CONDITION OVER THE PAST WEEK?: 2
WHEN YOU AWAKENED IN THE MORNING OVER THE LAST WEEK, PLEASE INDICATE THE AMOUNT OF TIME IT TAKES UNTIL YOU ARE AS LIMBER AS YOU WILL BE FOR THE DAY: < 10 MIN
ON A SCALE OF ONE TO TEN, HOW DIFFICULT WAS IT FOR YOU TO COMPLETE THE LISTED DAILY PHYSICAL TASKS OVER THE LAST WEEK: 0

## 2024-08-13 NOTE — PROGRESS NOTES
[facility]  Beau Collado D.O.  [address] , [address]   [facility]  Office : [phone], Fax: [phone]     RHEUMATOLOGY OFFICE VISIT NOTE  Date of Visit:  2024 10:48 AM    Patient Information:  Name:  Gisella Miner  :  1970  Age:  54 y.o.   Gender:  female      Ms. Miner is here today for follow-up of  +LATASHA  Joint pain  Chronic thoracic back pain 2/2 T10 hemangioma s/p T10 kyphoplasty w/osteocool 2024  Chronic fatigue  Hair loss    Last visit : 07/10/2024    Further workup:  2024  Negative dsDNA, RNP, Cramer, SCL 70, SSA, SSB, chromatin, Yumiko 1, centromere B, lupus anticoagulant panel  Normal C3, C4, ESR, CRP, CBC, CMP, UPCR, UA    XR R elbow 2024  Impression  Mild nonspecific arthritis with an elbow joint effusion. Occult radial head fracture not excluded.    MRI R elbow wo con 2024  IMPRESSION:  Mild osteoarthritis.  Mild joint effusion.  Mild lateral epicondylitis.    XR b/l knees 2024  Minimal early spur formation.    XR b/l hands 2024   Mild degenerative changes noted at the distal interphalangeal joint    History of Present Illness:    Since the last visit, patient is feeling \"good\".      Pain level: 2 /10  Location:  ankles , knees , hands , elbow   She did not take celebrex, could not remember if it was a replacement for ibuprofen or if she can take it as needed.   She did a PT evaluation for R elbow before MRI of the elbow would be approved. After that in the mornings with drinking her coffee driving to work it bothers her up and down the forearm and in her elbow with movement and she has also noted gelling phenomenon in the joint. She is flexing and extending her arm for exercise.  Ibuprofen prn   Robaxin 750 mg daily           2024    10:00 AM   DMARD/Biologic   AM Stiffness < 10 min   Pain 2   Fatigue 1   MDHAQ 0   Patient Global Score 3           REVIEW OF SYSTEMS: The following systems were reviewed with patient today

## 2024-08-13 NOTE — PATIENT INSTRUCTIONS
Suspect you have osteoarthritis but because of elbow joint fluid and osteoarthritis in this joint could trial plaquenil 200mg twice a day, would need 3 month trial  Okay to do PT for right elbow and f/u with ortho  Either use celebrex or ibuprofen should not use them on the same day but if needed for headache just use 12 hours apart  Can you celebrex as needed  F/u in 3 months

## 2024-08-14 ENCOUNTER — OFFICE VISIT (OUTPATIENT)
Age: 54
End: 2024-08-14

## 2024-08-14 DIAGNOSIS — R76.8 POSITIVE ANA (ANTINUCLEAR ANTIBODY): Primary | ICD-10-CM

## 2024-08-14 DIAGNOSIS — M19.90 INFLAMMATORY ARTHROPATHY: ICD-10-CM

## 2024-08-16 RX ORDER — BETAMETHASONE SODIUM PHOSPHATE AND BETAMETHASONE ACETATE 3; 3 MG/ML; MG/ML
6 INJECTION, SUSPENSION INTRA-ARTICULAR; INTRALESIONAL; INTRAMUSCULAR; SOFT TISSUE ONCE
Status: COMPLETED | OUTPATIENT
Start: 2024-08-16 | End: 2024-08-16

## 2024-08-16 RX ADMIN — BETAMETHASONE SODIUM PHOSPHATE AND BETAMETHASONE ACETATE 6 MG: 3; 3 INJECTION, SUSPENSION INTRA-ARTICULAR; INTRALESIONAL; INTRAMUSCULAR; SOFT TISSUE at 07:21

## 2024-08-16 NOTE — PROGRESS NOTES
large effusion of the elbow encompassing the ulnohumeral joint that tracks into the radiocapitellar joint and surrounds the radial neck, there are erosive changes on the posterior aspect of the capitellum, some cartilage thinning of the coronoid as well as the trochlea, large osteophyte on the posterior aspect of the olecranon articular surface    Assessment:   1. Positive LATASHA (antinuclear antibody)    2. Inflammatory arthropathy        Plan:   We discussed the diagnosis and different treatment options. We discussed observation, therapy, antiinflammatory medications and other pertinent treatment modalities.    After discussing in detail the patient elects to proceed with right elbow joint steroid injection, Celebrex 200 daily, I will reassess the patient in 2 months, I discussed with the patient that although she has some small osteophytic changes on the x-ray the MRI does show a very large effusion that tracks throughout the ulnohumeral joint as well as the radiocapitellar joint and surrounding the radial neck, there are some erosive changes on the posterior aspect of the capitellum, these changes to me are highly suggestive of inflammatory arthropathy.  I will discuss the case with Dr. Collado.    Procedure Note    The risk, benefits and alternatives of injection and no injection therapy were discussed. The patient consented for an injection. The patient has been identified by name and birthdate. The injection site was identified, marked and prepped with a alcohol swab. Time out completed. The Right elbow joint  was injected with 2ml of 6mg/ml Betamethasone and 2ml xylocaine plain 1%. The injection site was then dressed with a bandaid. The patient tolerated the injection well. The patient was instructed to monitor their blood sugars if diabetic and call if any concerns. The patient was instructed to call the office if any adverse local effects occurred or any if any questions or concerns arise.       Patient

## 2024-10-25 DIAGNOSIS — R76.8 POSITIVE ANA (ANTINUCLEAR ANTIBODY): Primary | ICD-10-CM

## 2024-10-25 DIAGNOSIS — M25.50 ARTHRALGIA, UNSPECIFIED JOINT: ICD-10-CM

## 2024-10-29 ENCOUNTER — TELEMEDICINE (OUTPATIENT)
Dept: RHEUMATOLOGY | Age: 54
End: 2024-10-29
Payer: COMMERCIAL

## 2024-10-29 DIAGNOSIS — R76.8 POSITIVE ANA (ANTINUCLEAR ANTIBODY): ICD-10-CM

## 2024-10-29 DIAGNOSIS — M06.4 UNDIFFERENTIATED INFLAMMATORY ARTHRITIS (HCC): Primary | ICD-10-CM

## 2024-10-29 PROBLEM — M19.90 UNDIFFERENTIATED INFLAMMATORY ARTHRITIS: Status: ACTIVE | Noted: 2024-07-10

## 2024-10-29 PROCEDURE — 99214 OFFICE O/P EST MOD 30 MIN: CPT | Performed by: INTERNAL MEDICINE

## 2024-10-29 RX ORDER — HYDROXYCHLOROQUINE SULFATE 200 MG/1
TABLET, FILM COATED ORAL
Qty: 194 TABLET | Refills: 0 | Status: SHIPPED | OUTPATIENT
Start: 2024-10-29 | End: 2025-02-10

## 2024-10-29 RX ORDER — NICOTINE POLACRILEX 4 MG/1
20 GUM, CHEWING ORAL DAILY
COMMUNITY

## 2024-10-29 RX ORDER — GALCANEZUMAB 120 MG/ML
120 INJECTION, SOLUTION SUBCUTANEOUS
COMMUNITY
Start: 2024-06-28 | End: 2025-10-14

## 2024-10-29 NOTE — ASSESSMENT & PLAN NOTE
Referred for positive LATASHA which was checked by dermatology to evaluate androgenic alopecia and telogen effluvium, no biopsy performed. No cytopenias, cutaneous manifestations, inflammatory arthritis, serositis, renal, lung disease to suggest SLE. Unable to review labs that were performed by the patient's dermatologist.     7/2024  Negative dsDNA, RNP, Cramer, SCL 70, SSA, SSB, chromatin, Yumiko 1, centromere B, lupus anticoagulant panel  Normal C3, C4, ESR, CRP, CBC, CMP, UPCR, UA     Plan:  -no e/o of CTD related to positive LATASHA based on lab work LATASHA not clinically significant

## 2024-10-29 NOTE — ASSESSMENT & PLAN NOTE
Reported hx of MARGARITA at the age of 9 yo with monoarticular arthritis of the left knee, had swelling w/o pain. Tx with cast for 6 weeks, bufferin 2 tabs tid for several weeks, and drainage 1x/week for several weeks in a row. She did not have any other complications from this and has not needed f/u with an adult rheumatologist.      MRI R elbow performed b/c of possible occult fracture no e/o fracture but per orthopedic surgery review of MRI Dr. Zhu MRI does show a very large effusion that tracks throughout the ulnohumeral joint as well as the radiocapitellar joint and surrounding the radial neck, there are some erosive changes on the posterior aspect of the capitellum, these changes to me are highly suggestive of inflammatory arthropathy.      2021 - RF negative     XR R elbow 7/2024  Impression  Mild nonspecific arthritis with an elbow joint effusion. Occult radial head fracture not excluded.     MRI R elbow wo con 7/2024  IMPRESSION:  Mild osteoarthritis.  Mild joint effusion.  Mild lateral epicondylitis.     XR b/l knees 7/2024  Minimal early spur formation.     XR b/l hands 7/2024   Mild degenerative changes noted at the distal interphalangeal joint     Today she has ongoing pain/stiffness primarily affecting right elbow. No significant functional limitations. But will trial HCQ for suspected inflammatory arthritis based on orthopedic surgery's review of MRI R elbow. The right elbow corticosteroid injection helped for about 2 weeks.      Plan:  -start plaquenil 400mg qd Mon-Fri and 200mg qd sat-sun  -stop celebrex 200mg qd   -okay to use ibuprofen prn  -cont PT of R elbow   -RTC in 5 months, check labs today including CCP then repeat routine labs q4 months     Side effects including dizziness, headaches, GI intolerance, retinal toxicity, skin rash discussed with patient.

## 2024-10-29 NOTE — PATIENT INSTRUCTIONS
Stop taking celebrex  Start taking plaquenil for rheumatoid arthritis 200mg daily for 2 weeks, then 400mg daily Mon-Fri and 200mg daily Sat-Sun (it can take about 3 full months before it starts working)  Get labs repeated this week, then every 4 months while on treatment ([facility]  building [address] room 150)  Notify our office for medication refills requests/side affects or flares in arthritis  F/u in 5 months

## 2024-10-29 NOTE — PROGRESS NOTES
neck, there are some erosive changes on the posterior aspect of the capitellum, these changes to me are highly suggestive of inflammatory arthropathy.     She had her flu vaccine last Friday.    Objective   Patient-Reported Vitals  No data recorded     Physical Exam  [INSTRUCTIONS:  \"[x]\" Indicates a positive item  \"[]\" Indicates a negative item  -- DELETE ALL ITEMS NOT EXAMINED]    Constitutional: [x] Appears well-developed and well-nourished [x] No apparent distress      [] Abnormal -     Mental status: [x] Alert and awake  [x] Oriented to person/place/time [x] Able to follow commands    [] Abnormal -     Eyes:   EOM    [x]  Normal    [] Abnormal -   Sclera  [x]  Normal    [] Abnormal -          Discharge [x]  None visible   [] Abnormal -     HENT: [x] Normocephalic, atraumatic  [] Abnormal -     External Ears [x] Normal  [] Abnormal -    Neck: [x] No visualized mass [] Abnormal -     Pulmonary/Chest: [x] Respiratory effort normal   [x] No visualized signs of difficulty breathing or respiratory distress        [] Abnormal -      Musculoskeletal:           [x] Normal range of motion of neck, hands, wrists, elbows, shoulders        [] Abnormal -     Neurological:        [x] No Facial Asymmetry (Cranial nerve 7 motor function) (limited exam due to video visit)          [x] No gaze palsy        [] Abnormal -          Skin:        [x] No significant exanthematous lesions or discoloration noted on facial skin         [] Abnormal -            Psychiatric:       [x] Normal Affect [] Abnormal -        [x] No Hallucinations    Other pertinent observable physical exam findings:-         On this date 10/29/2024 I have spent 30-39 minutes reviewing previous notes, test results and face to face (virtual) with the patient discussing the diagnosis and importance of compliance with the treatment plan as well as documenting on the day of the visit.    --Beau Collado, DO

## 2024-11-01 DIAGNOSIS — R76.8 POSITIVE ANA (ANTINUCLEAR ANTIBODY): ICD-10-CM

## 2024-11-01 DIAGNOSIS — M25.50 ARTHRALGIA, UNSPECIFIED JOINT: ICD-10-CM

## 2024-11-01 LAB
ALBUMIN SERPL-MCNC: 4 G/DL (ref 3.5–5)
ALBUMIN/GLOB SERPL: 1.5 (ref 1–1.9)
ALP SERPL-CCNC: 78 U/L (ref 35–104)
ALT SERPL-CCNC: 31 U/L (ref 8–45)
ANION GAP SERPL CALC-SCNC: 10 MMOL/L (ref 7–16)
AST SERPL-CCNC: 30 U/L (ref 15–37)
BASOPHILS # BLD: 0 K/UL (ref 0–0.2)
BASOPHILS NFR BLD: 1 % (ref 0–2)
BILIRUB SERPL-MCNC: 1.1 MG/DL (ref 0–1.2)
BUN SERPL-MCNC: 20 MG/DL (ref 6–23)
CALCIUM SERPL-MCNC: 9.2 MG/DL (ref 8.8–10.2)
CHLORIDE SERPL-SCNC: 104 MMOL/L (ref 98–107)
CO2 SERPL-SCNC: 29 MMOL/L (ref 20–29)
CREAT SERPL-MCNC: 0.59 MG/DL (ref 0.6–1.1)
DIFFERENTIAL METHOD BLD: NORMAL
EOSINOPHIL # BLD: 0.2 K/UL (ref 0–0.8)
EOSINOPHIL NFR BLD: 3 % (ref 0.5–7.8)
ERYTHROCYTE [DISTWIDTH] IN BLOOD BY AUTOMATED COUNT: 12.3 % (ref 11.9–14.6)
ERYTHROCYTE [SEDIMENTATION RATE] IN BLOOD: 1 MM/HR (ref 0–30)
GLOBULIN SER CALC-MCNC: 2.8 G/DL (ref 2.3–3.5)
GLUCOSE SERPL-MCNC: 80 MG/DL (ref 70–99)
HCT VFR BLD AUTO: 40.8 % (ref 35.8–46.3)
HGB BLD-MCNC: 13.4 G/DL (ref 11.7–15.4)
IMM GRANULOCYTES # BLD AUTO: 0 K/UL (ref 0–0.5)
IMM GRANULOCYTES NFR BLD AUTO: 0 % (ref 0–5)
LYMPHOCYTES # BLD: 2.5 K/UL (ref 0.5–4.6)
LYMPHOCYTES NFR BLD: 37 % (ref 13–44)
MCH RBC QN AUTO: 30 PG (ref 26.1–32.9)
MCHC RBC AUTO-ENTMCNC: 32.8 G/DL (ref 31.4–35)
MCV RBC AUTO: 91.5 FL (ref 82–102)
MONOCYTES # BLD: 0.6 K/UL (ref 0.1–1.3)
MONOCYTES NFR BLD: 9 % (ref 4–12)
NEUTS SEG # BLD: 3.3 K/UL (ref 1.7–8.2)
NEUTS SEG NFR BLD: 50 % (ref 43–78)
NRBC # BLD: 0 K/UL (ref 0–0.2)
PLATELET # BLD AUTO: 232 K/UL (ref 150–450)
PMV BLD AUTO: 11.1 FL (ref 9.4–12.3)
POTASSIUM SERPL-SCNC: 3.7 MMOL/L (ref 3.5–5.1)
PROT SERPL-MCNC: 6.8 G/DL (ref 6.3–8.2)
RBC # BLD AUTO: 4.46 M/UL (ref 4.05–5.2)
SODIUM SERPL-SCNC: 143 MMOL/L (ref 136–145)
WBC # BLD AUTO: 6.6 K/UL (ref 4.3–11.1)

## 2024-11-05 LAB — CRP SERPL-MCNC: 2 MG/L (ref 0–10)

## 2024-11-22 ENCOUNTER — OFFICE VISIT (OUTPATIENT)
Age: 54
End: 2024-11-22

## 2024-11-22 DIAGNOSIS — M19.021 ARTHRITIS OF RIGHT ELBOW: Primary | ICD-10-CM

## 2024-11-22 RX ORDER — BETAMETHASONE SODIUM PHOSPHATE AND BETAMETHASONE ACETATE 3; 3 MG/ML; MG/ML
12 INJECTION, SUSPENSION INTRA-ARTICULAR; INTRALESIONAL; INTRAMUSCULAR; SOFT TISSUE ONCE
Status: COMPLETED | OUTPATIENT
Start: 2024-11-22 | End: 2024-11-22

## 2024-11-22 RX ADMIN — BETAMETHASONE SODIUM PHOSPHATE AND BETAMETHASONE ACETATE 12 MG: 3; 3 INJECTION, SUSPENSION INTRA-ARTICULAR; INTRALESIONAL; INTRAMUSCULAR; SOFT TISSUE at 08:03

## 2024-11-22 NOTE — PROGRESS NOTES
Orthopaedic Hand Clinic Note    Name: Gisella Miner  Age: 54 y.o.  YOB: 1970  Gender: female  MRN: [id number]      Follow up visit:   1. Arthritis of right elbow        HPI: Gisella Miner is a 54 y.o. female who is following up for right elbow pain, on the last visit I performed a steroid injection, this only provided relief for a couple of weeks however, patient reports that for those 2 weeks she had no pain whatsoever.  She has been started on treatment for juvenile rheumatoid arthritis, it has only been 2 or 3 weeks since she started that medication and she has not felt much difference yet.      ROS/Meds/PSH/PMH/FH/SH: I personally reviewed the patients standard intake form.  Pertinents are discussed in the HPI    Physical Examination:  General: Awake and alert.  HEENT: Normocephalic, atraumatic  CV/Pulm: Breathing even and unlabored  Skin: No obvious rashes noted.  Lymphatic: No obvious evidence of lymphedema or lymphadenopathy    Musculoskeletal Examination:  Examination on the right upper extremity demonstrates cap refill < 5 seconds in all fingers, tenderness palpation of the right elbow is mostly global, lateral medial aspect as well as posterior aspect, some pain with terminal elbow flexion and extension, some pain with palpation of the olecranon fossa, elbow pain is moderately aggravated with resisted extension tension as well as resisted pronation.    Imaging / Electrodiagnostic Tests:     X-ray of the right elbow was reviewed and independently interpreted, this demonstrates a very subtle osteophyte on the medial aspect of the coronoid as well as on the radial head, small osteophyte at the tip of the olecranon articular side, these changes are suggestive of osteoarthritis    MRI of the right elbow was reviewed and independently interpreted, this demonstrates a very large effusion of the elbow encompassing the ulnohumeral joint that tracks into the radiocapitellar joint and surrounds the

## 2024-12-12 ENCOUNTER — APPOINTMENT (OUTPATIENT)
Dept: URBAN - METROPOLITAN AREA CLINIC 330 | Facility: CLINIC | Age: 54
Setting detail: DERMATOLOGY
End: 2024-12-12

## 2024-12-12 DIAGNOSIS — L65.9 NONSCARRING HAIR LOSS, UNSPECIFIED: ICD-10-CM | Status: INADEQUATELY CONTROLLED

## 2024-12-12 PROCEDURE — ? COUNSELING

## 2024-12-12 PROCEDURE — 99202 OFFICE O/P NEW SF 15 MIN: CPT

## 2024-12-12 PROCEDURE — ? ADDITIONAL NOTES

## 2024-12-12 ASSESSMENT — LOCATION ZONE DERM: LOCATION ZONE: FACE

## 2024-12-12 ASSESSMENT — LOCATION DETAILED DESCRIPTION DERM: LOCATION DETAILED: RIGHT SUPERIOR MEDIAL FOREHEAD

## 2024-12-12 ASSESSMENT — LOCATION SIMPLE DESCRIPTION DERM: LOCATION SIMPLE: RIGHT FOREHEAD

## 2024-12-12 NOTE — PROCEDURE: ADDITIONAL NOTES
Additional Notes: Pt is here for a second opinion on hair loss. She states she has started many medications within the past year, many stressors at work and a recent back surgery. Pt states she is menopausal \\n\\nDiscussed the different types of hair loss. She states her loss started suddenly. Discussed ordering labs to rule out any abnormalities. Pt rheumatologist checked YUMIKO & TSH labs & they were within normal limits. Pt can email labs. If she decides to proceed with additional labs, we can email her order. \\n\\nShe is currently on minoxidil 2.5mg & finasteride but is interested in stopping medications. New hair growth is present today. Pt inquired about scalp injections. Emphasized with pt that her type of hair loss is not treated with intralesional kenalog injections. 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
Render Risk Assessment In Note?: no
Detail Level: Simple

## 2025-01-29 ENCOUNTER — TELEPHONE (OUTPATIENT)
Dept: RHEUMATOLOGY | Age: 55
End: 2025-01-29

## 2025-01-30 NOTE — TELEPHONE ENCOUNTER
[facility] pt, VV 10/29/24 w Plan  Dx Inflamm Arth, see pt message below    Plan:  -start plaquenil 400mg qd Mon-Fri and 200mg qd sat-sun  -stop celebrex 200mg qd   -okay to use ibuprofen prn  -cont PT of R elbow   -RTC in 5 months, check labs today including CCP then repeat routine labs q4 months    Per pt,  hello, i have been taking plaquenil since mid october and on december 29th, i woke up with hives on the back of my thighs.  I have had a few sporadic spots since then and have noticed an uptick in just plain itching.  i havent changed detergents, soaps, perfumes or anything else that i can think of.  Plaquenil is the newest med i am on, and the only thing i can think that might be the culprit, even though it took a while to occur.  i am close to needing a refill, and wanted to bring this to someone's attention before I did refill it just in case Plaquenil might be causing an issue.  Thanks, Gisella MOSS

## 2025-02-03 ENCOUNTER — TELEPHONE (OUTPATIENT)
Dept: RHEUMATOLOGY | Age: 55
End: 2025-02-03

## 2025-02-03 DIAGNOSIS — M06.4 UNDIFFERENTIATED INFLAMMATORY ARTHRITIS (HCC): Primary | ICD-10-CM

## 2025-02-03 DIAGNOSIS — R76.8 POSITIVE ANA (ANTINUCLEAR ANTIBODY): ICD-10-CM

## 2025-02-03 RX ORDER — HYDROXYCHLOROQUINE SULFATE 200 MG/1
200 TABLET, FILM COATED ORAL 2 TIMES DAILY
Qty: 180 TABLET | Refills: 0 | Status: SHIPPED | OUTPATIENT
Start: 2025-02-03 | End: 2025-05-04

## 2025-02-03 NOTE — TELEPHONE ENCOUNTER
Message from patient:  I know Dr. Collado is out on leave, and my bottle says I have 1 refill left, but on the [facility] whit it says I have to contact office for refill.  I have 3 pills left and I take it bid.  Can someone please call in refills for the plaquenil to [facility] [address] [facility]?  Thank you     Rx for Plaquenil 200 mg BID (90 days with no refills) entered and pended for review and sig. Last written 10/29/24 for 3 months with no refills.   Next OV 3/26/25 with Dr. Collado.     Lab Results   Component Value Date     11/01/2024    K 3.7 11/01/2024     11/01/2024    CO2 29 11/01/2024    BUN 20 11/01/2024    CREATININE 0.59 (L) 11/01/2024    GLUCOSE 80 11/01/2024    CALCIUM 9.2 11/01/2024    BILITOT 1.1 11/01/2024    ALKPHOS 78 11/01/2024    AST 30 11/01/2024    ALT 31 11/01/2024    LABGLOM >90 11/01/2024    GLOB 2.8 11/01/2024       Lab Results   Component Value Date    WBC 6.6 11/01/2024    HGB 13.4 11/01/2024    HCT 40.8 11/01/2024    MCV 91.5 11/01/2024     11/01/2024

## 2025-02-24 ENCOUNTER — OFFICE VISIT (OUTPATIENT)
Age: 55
End: 2025-02-24
Payer: COMMERCIAL

## 2025-02-24 DIAGNOSIS — R76.8 POSITIVE ANA (ANTINUCLEAR ANTIBODY): Primary | ICD-10-CM

## 2025-02-24 DIAGNOSIS — M19.90 INFLAMMATORY ARTHROPATHY: ICD-10-CM

## 2025-02-24 DIAGNOSIS — M19.021 ARTHRITIS OF RIGHT ELBOW: ICD-10-CM

## 2025-02-24 PROCEDURE — 99213 OFFICE O/P EST LOW 20 MIN: CPT | Performed by: ORTHOPAEDIC SURGERY

## 2025-02-24 NOTE — PROGRESS NOTES
Orthopedic Hand Surgery Note    Gisella Miner  1970  female    Visit Diagnoses         Codes    Inflammatory arthropathy     M19.90    Arthritis of right elbow     M19.021          Assessment & Plan  1. 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  She reports that Plaquenil initially helped but she has been experiencing more aches and pains recently. A previous right elbow steroid injection provided good relief. Currently, she experiences pain mostly at night or in the mornings, especially if she sleeps on it incorrectly, leading to stiffness and pain. On physical examination, she has very good active and passive elbow motion with only mild pain at terminal flexion and extension. Repeat injections and anti-inflammatories were discussed, but she prefers to give it more time. She will be reassessed on an as-needed basis.    PROCEDURE  A right elbow steroid injection was performed during the last visit, providing significant relief.    Imaging/NCS    None new    History of Present Illness  The patient is here for reevaluation of right elbow arthritis secondary to juvenile rheumatoid arthritis.    Her treatment continues to be Plaquenil, which she believes is helping; however, she may have reached a plateau. Initially, the medication provided more relief, but lately, she has been experiencing more aches and pains. At this point, the elbow is not causing much pain throughout the day. She feels mostly pain at night or in the mornings, especially if she sleeps on it the wrong way, it gets extremely stiff and painful. Her treatment continues to be Plaquenil. On the last visit, a right elbow steroid injection was performed, which did provide good relief.    MEDICATIONS  Plaquenil    Physical Exam  The patient has very good active and passive motion in the right elbow. There is only mild pain with terminal flexion and extension.    Eduardo Lin Jr, MD, PhD  Orthopaedic Surgery  02/24/25  12:22

## 2025-03-14 DIAGNOSIS — R76.8 POSITIVE ANA (ANTINUCLEAR ANTIBODY): ICD-10-CM

## 2025-03-14 DIAGNOSIS — M25.50 ARTHRALGIA, UNSPECIFIED JOINT: ICD-10-CM

## 2025-03-14 LAB
ALBUMIN SERPL-MCNC: 4 G/DL (ref 3.5–5)
ALBUMIN/GLOB SERPL: 1.5 (ref 1–1.9)
ALP SERPL-CCNC: 85 U/L (ref 35–104)
ALT SERPL-CCNC: 40 U/L (ref 8–45)
ANION GAP SERPL CALC-SCNC: 12 MMOL/L (ref 7–16)
AST SERPL-CCNC: 31 U/L (ref 15–37)
BASOPHILS # BLD: 0.05 K/UL (ref 0–0.2)
BASOPHILS NFR BLD: 0.7 % (ref 0–2)
BILIRUB SERPL-MCNC: 0.9 MG/DL (ref 0–1.2)
BUN SERPL-MCNC: 23 MG/DL (ref 6–23)
CALCIUM SERPL-MCNC: 9.5 MG/DL (ref 8.8–10.2)
CHLORIDE SERPL-SCNC: 104 MMOL/L (ref 98–107)
CO2 SERPL-SCNC: 27 MMOL/L (ref 20–29)
CREAT SERPL-MCNC: 0.64 MG/DL (ref 0.6–1.1)
CRP SERPL-MCNC: <0.3 MG/DL (ref 0–0.4)
DIFFERENTIAL METHOD BLD: NORMAL
EOSINOPHIL # BLD: 0.13 K/UL (ref 0–0.8)
EOSINOPHIL NFR BLD: 1.9 % (ref 0.5–7.8)
ERYTHROCYTE [DISTWIDTH] IN BLOOD BY AUTOMATED COUNT: 12.5 % (ref 11.9–14.6)
ERYTHROCYTE [SEDIMENTATION RATE] IN BLOOD: 1 MM/HR (ref 0–30)
GLOBULIN SER CALC-MCNC: 2.6 G/DL (ref 2.3–3.5)
GLUCOSE SERPL-MCNC: 102 MG/DL (ref 70–99)
HCT VFR BLD AUTO: 40.8 % (ref 35.8–46.3)
HGB BLD-MCNC: 13.2 G/DL (ref 11.7–15.4)
IMM GRANULOCYTES # BLD AUTO: 0.02 K/UL (ref 0–0.5)
IMM GRANULOCYTES NFR BLD AUTO: 0.3 % (ref 0–5)
LYMPHOCYTES # BLD: 1.69 K/UL (ref 0.5–4.6)
LYMPHOCYTES NFR BLD: 25.1 % (ref 13–44)
MCH RBC QN AUTO: 29.8 PG (ref 26.1–32.9)
MCHC RBC AUTO-ENTMCNC: 32.4 G/DL (ref 31.4–35)
MCV RBC AUTO: 92.1 FL (ref 82–102)
MONOCYTES # BLD: 0.66 K/UL (ref 0.1–1.3)
MONOCYTES NFR BLD: 9.8 % (ref 4–12)
NEUTS SEG # BLD: 4.19 K/UL (ref 1.7–8.2)
NEUTS SEG NFR BLD: 62.2 % (ref 43–78)
NRBC # BLD: 0 K/UL (ref 0–0.2)
PLATELET # BLD AUTO: 198 K/UL (ref 150–450)
PMV BLD AUTO: 11.4 FL (ref 9.4–12.3)
POTASSIUM SERPL-SCNC: 3.8 MMOL/L (ref 3.5–5.1)
PROT SERPL-MCNC: 6.6 G/DL (ref 6.3–8.2)
RBC # BLD AUTO: 4.43 M/UL (ref 4.05–5.2)
SODIUM SERPL-SCNC: 143 MMOL/L (ref 136–145)
WBC # BLD AUTO: 6.7 K/UL (ref 4.3–11.1)

## 2025-03-26 ENCOUNTER — OFFICE VISIT (OUTPATIENT)
Dept: RHEUMATOLOGY | Age: 55
End: 2025-03-26
Payer: COMMERCIAL

## 2025-03-26 VITALS
SYSTOLIC BLOOD PRESSURE: 118 MMHG | DIASTOLIC BLOOD PRESSURE: 62 MMHG | HEART RATE: 60 BPM | OXYGEN SATURATION: 97 % | WEIGHT: 161.4 LBS | HEIGHT: 65 IN | BODY MASS INDEX: 26.89 KG/M2

## 2025-03-26 DIAGNOSIS — Z79.899 LONG-TERM USE OF HIGH-RISK MEDICATION: Chronic | ICD-10-CM

## 2025-03-26 DIAGNOSIS — R76.8 POSITIVE ANA (ANTINUCLEAR ANTIBODY): Chronic | ICD-10-CM

## 2025-03-26 DIAGNOSIS — M06.4 UNDIFFERENTIATED INFLAMMATORY ARTHRITIS: Primary | Chronic | ICD-10-CM

## 2025-03-26 PROCEDURE — 3074F SYST BP LT 130 MM HG: CPT | Performed by: INTERNAL MEDICINE

## 2025-03-26 PROCEDURE — 99214 OFFICE O/P EST MOD 30 MIN: CPT | Performed by: INTERNAL MEDICINE

## 2025-03-26 PROCEDURE — 3078F DIAST BP <80 MM HG: CPT | Performed by: INTERNAL MEDICINE

## 2025-03-26 RX ORDER — FOLIC ACID 1 MG/1
1 TABLET ORAL DAILY
Qty: 90 TABLET | Refills: 0 | Status: CANCELLED | OUTPATIENT
Start: 2025-03-26 | End: 2025-06-24

## 2025-03-26 RX ORDER — METHOTREXATE 2.5 MG/1
10 TABLET ORAL WEEKLY
Qty: 16 TABLET | Refills: 2 | Status: CANCELLED | OUTPATIENT
Start: 2025-03-26 | End: 2025-06-24

## 2025-03-26 ASSESSMENT — JOINT PAIN
TOTAL NUMBER OF SWOLLEN JOINTS: 0
TOTAL NUMBER OF TENDER JOINTS: 1

## 2025-03-26 ASSESSMENT — ROUTINE ASSESSMENT OF PATIENT INDEX DATA (RAPID3)
ON A SCALE OF ONE TO TEN, HOW MUCH PAIN HAVE YOU HAD BECAUSE OF YOUR CONDITION OVER THE PAST WEEK?: 1
ON A SCALE OF ONE TO TEN, HOW DIFFICULT WAS IT FOR YOU TO COMPLETE THE LISTED DAILY PHYSICAL TASKS OVER THE LAST WEEK: 0.1
ON A SCALE OF ONE TO TEN, HOW MUCH OF A PROBLEM HAS UNUSUAL FATIGUE OR TIREDNESS BEEN FOR YOU OVER THE PAST WEEK?: 0
ON A SCALE OF ONE TO TEN, CONSIDERING ALL THE WAYS IN WHICH ILLNESS AND HEALTH CONDITIONS MAY AFFECT YOU AT THIS TIME, PLEASE INDICATE BELOW HOW YOU ARE DOING:: 1

## 2025-03-26 NOTE — PROGRESS NOTES
[facility] Rhe[facility]  Beau Collado D.O.  [address] , [address]   [place], [place]-[address]  Office : [phone], Fax: [phone]     RHEUMATOLOGY OFFICE VISIT NOTE  Date of Visit:  3/26/2025 1:23 PM      SUBJECTIVE:  Gisella Miner is a 55 y.o. female that is here for follow-up of:  Undifferentiated inflammatory arthritis    hx of MARGARITA at the age of 11 yo with monoarticular arthritis of the left knee, had swelling w/o pain Tx with cast for 6 weeks, bufferin 2 tabs tid for several weeks, and drainage 1x/week for several weeks in a row  2021 (-) RF  MRI R elbow- per ortho does show a very large effusion that tracks throughout the ulnohumeral joint as well as the radiocapitellar joint and surrounding the radial neck, there are some erosive changes on the posterior aspect of the capitellum, these changes to me are highly suggestive of inflammatory arthropathy.   HCQ 400mg qd Mon-Fri and 200mg qd sat-sun started 10/29/24    Positive LATASHA (antinuclear antibody) not clinically significant  7/2024 Negative dsDNA, RNP, Cramer, SCL 70, SSA, SSB, chromatin, Yumiko 1, centromere B, lupus anticoagulant panel; Normal C3, C4, ESR, CRP, CBC, CMP, UPCR, UA     Last OV 10/29/24 virtual visit     --------------    Today:  Since last visit patient has been feeling: good     At first she thought it was helping. She's gotten a few steroid injections into the right elbow, so she thinks it was more the steroids that helped along with it. Last seen 2 months ago and she declined doing another injection. Its fine, every now and then with having it bent up for too long she has stiffness. She has been doing different classes at the gym for the past 3 months. Every now and then some joints hurt a little bit and she wonders if it's due to her to her intense workout regimen or if it's due to something else.   Sitting for 1 hour at work you no longer has stiffness in the ankles, knees and hips. Most of her pain now after

## 2025-03-26 NOTE — PROGRESS NOTES
Since last visit patient has been feeling: good    Any Rheumatology medication side effects:  None  Hospitalizations:  None    Infections:   None  Vaccinations: flu       3/26/2025    12:00 PM   DMARD/Biologic   AM Stiffness None   Pain 1   Fatigue 0   MDHAQ 0.1   Patient Global Score 1   Medication Name Plaquenil   Dose 200mg      REVIEW OF SYSTEMS:  A total of 10 systems (musculoskeletal system as stated in the HPI and the following 9 systems) were reviewed with patient today and were negative except as stated in the HPI and except for the following (depicted with an \"X\"):        \"X\" Constitutional  \"X\" HEENT /Mouth  \"X\" Cardiovascular and  Respiratory (2 systems)  \"X\" Gastrointestinal    Fever/chills  x Hair loss   Shortness of breath   Upset stomach    Falls   Dry mouth   Coughing   Diarrhea / constipation    Wt loss   Mouth sores   Wheezing   Heartburn    Wt gain   Ringing ears   Chest pain   Dark or bloody stools    Night sweats   Diff. swallowing  X None of above   Nausea or vomiting   X None of above   None of above     X None of above                \"X\" Integumentary  \"X\" Neurological  \"X\" Genitourinary  \"X\" Psychiatric    Easy bruising   Numbness/ tingling   Female problems   Depression    Rashes   Weakness   Problems with urination   Feeling anxious    Sun sensitivity   Headaches  X None of above   Problems sleeping   X None of above  X None of above     X None of above

## 2025-03-26 NOTE — PATIENT INSTRUCTIONS
Make sure you get the eye exam for plaquenil toxicity in 8/2025 and send me the office visit note  Okay to take plaquenil 400mg once a day  Get labs 1 week before your appointment (make sure they collect CBC, CMP, ESR, CRP, and CCP antibody)

## 2025-05-01 DIAGNOSIS — R76.8 POSITIVE ANA (ANTINUCLEAR ANTIBODY): ICD-10-CM

## 2025-05-01 DIAGNOSIS — M19.90 UNDIFFERENTIATED INFLAMMATORY ARTHRITIS: ICD-10-CM

## 2025-05-01 RX ORDER — HYDROXYCHLOROQUINE SULFATE 200 MG/1
200 TABLET, FILM COATED ORAL 2 TIMES DAILY
Qty: 180 TABLET | Refills: 0 | Status: SHIPPED | OUTPATIENT
Start: 2025-05-01 | End: 2025-07-30

## 2025-05-01 NOTE — TELEPHONE ENCOUNTER
Primary Rheumatologist:      Last OV:3/26/2025   Next OV:7/10/2025    Medication Requested: HCQ  Rx last written:      Last eye exam: will have in 8/2025      Recent Labs:    Lab Results   Component Value Date    WBC 6.7 03/14/2025    HGB 13.2 03/14/2025    HCT 40.8 03/14/2025    MCV 92.1 03/14/2025     03/14/2025    LYMPHOPCT 25.1 03/14/2025    RBC 4.43 03/14/2025    MCH 29.8 03/14/2025    MCHC 32.4 03/14/2025    RDW 12.5 03/14/2025     Lab Results   Component Value Date     03/14/2025    K 3.8 03/14/2025     03/14/2025    CO2 27 03/14/2025    BUN 23 03/14/2025    CREATININE 0.64 03/14/2025    GLUCOSE 102 (H) 03/14/2025    CALCIUM 9.5 03/14/2025    BILITOT 0.9 03/14/2025    ALKPHOS 85 03/14/2025    AST 31 03/14/2025    ALT 40 03/14/2025    LABGLOM >90 03/14/2025    GLOB 2.6 03/14/2025     Lab Results   Component Value Date    CRP <0.3 03/14/2025      Lab Results   Component Value Date    SEDRATE 1 03/14/2025      No results found for: \"VITD25\"

## 2025-05-23 LAB
CHOLEST SERPL-MCNC: 136 MG/DL (ref 0–200)
GLUCOSE SERPL-MCNC: 95 MG/DL (ref 70–99)
HDLC SERPL-MCNC: 56 MG/DL (ref 40–60)
LDLC SERPL CALC-MCNC: 62 MG/DL (ref 0–100)
TRIGL SERPL-MCNC: 91 MG/DL (ref 0–150)

## 2025-07-17 DIAGNOSIS — R76.8 POSITIVE ANA (ANTINUCLEAR ANTIBODY): ICD-10-CM

## 2025-07-17 DIAGNOSIS — M25.50 ARTHRALGIA, UNSPECIFIED JOINT: ICD-10-CM

## 2025-07-17 DIAGNOSIS — M19.90 UNDIFFERENTIATED INFLAMMATORY ARTHRITIS: Chronic | ICD-10-CM

## 2025-07-17 LAB
ALBUMIN SERPL-MCNC: 3.8 G/DL (ref 3.5–5)
ALBUMIN/GLOB SERPL: 1.5 (ref 1–1.9)
ALP SERPL-CCNC: 81 U/L (ref 35–104)
ALT SERPL-CCNC: 41 U/L (ref 8–45)
ANION GAP SERPL CALC-SCNC: 11 MMOL/L (ref 7–16)
AST SERPL-CCNC: 33 U/L (ref 15–37)
BASOPHILS # BLD: 0.02 K/UL (ref 0–0.2)
BASOPHILS NFR BLD: 0.4 % (ref 0–2)
BILIRUB SERPL-MCNC: 0.7 MG/DL (ref 0–1.2)
BUN SERPL-MCNC: 21 MG/DL (ref 6–23)
CALCIUM SERPL-MCNC: 9.5 MG/DL (ref 8.8–10.2)
CHLORIDE SERPL-SCNC: 102 MMOL/L (ref 98–107)
CO2 SERPL-SCNC: 28 MMOL/L (ref 20–29)
CREAT SERPL-MCNC: 0.64 MG/DL (ref 0.6–1.1)
CRP SERPL-MCNC: <0.3 MG/DL (ref 0–0.4)
DIFFERENTIAL METHOD BLD: NORMAL
EOSINOPHIL # BLD: 0.18 K/UL (ref 0–0.8)
EOSINOPHIL NFR BLD: 3.6 % (ref 0.5–7.8)
ERYTHROCYTE [DISTWIDTH] IN BLOOD BY AUTOMATED COUNT: 12.4 % (ref 11.9–14.6)
ERYTHROCYTE [SEDIMENTATION RATE] IN BLOOD: <1 MM/HR (ref 0–30)
GLOBULIN SER CALC-MCNC: 2.6 G/DL (ref 2.3–3.5)
GLUCOSE SERPL-MCNC: 104 MG/DL (ref 70–99)
HCT VFR BLD AUTO: 40.8 % (ref 35.8–46.3)
HGB BLD-MCNC: 13.6 G/DL (ref 11.7–15.4)
IMM GRANULOCYTES # BLD AUTO: 0.03 K/UL (ref 0–0.5)
IMM GRANULOCYTES NFR BLD AUTO: 0.6 % (ref 0–5)
LYMPHOCYTES # BLD: 1.66 K/UL (ref 0.5–4.6)
LYMPHOCYTES NFR BLD: 33 % (ref 13–44)
MCH RBC QN AUTO: 30.6 PG (ref 26.1–32.9)
MCHC RBC AUTO-ENTMCNC: 33.3 G/DL (ref 31.4–35)
MCV RBC AUTO: 91.7 FL (ref 82–102)
MONOCYTES # BLD: 0.46 K/UL (ref 0.1–1.3)
MONOCYTES NFR BLD: 9.1 % (ref 4–12)
NEUTS SEG # BLD: 2.68 K/UL (ref 1.7–8.2)
NEUTS SEG NFR BLD: 53.3 % (ref 43–78)
NRBC # BLD: 0 K/UL (ref 0–0.2)
PLATELET # BLD AUTO: 167 K/UL (ref 150–450)
PMV BLD AUTO: 11.2 FL (ref 9.4–12.3)
POTASSIUM SERPL-SCNC: 4.3 MMOL/L (ref 3.5–5.1)
PROT SERPL-MCNC: 6.3 G/DL (ref 6.3–8.2)
RBC # BLD AUTO: 4.45 M/UL (ref 4.05–5.2)
SODIUM SERPL-SCNC: 140 MMOL/L (ref 136–145)
WBC # BLD AUTO: 5 K/UL (ref 4.3–11.1)

## 2025-07-18 LAB — CCP IGA+IGG SERPL IA-ACNC: 5 UNITS (ref 0–19)

## 2025-08-04 ENCOUNTER — OFFICE VISIT (OUTPATIENT)
Dept: RHEUMATOLOGY | Age: 55
End: 2025-08-04
Payer: COMMERCIAL

## 2025-08-04 ENCOUNTER — OFFICE VISIT (OUTPATIENT)
Age: 55
End: 2025-08-04

## 2025-08-04 VITALS
BODY MASS INDEX: 25.66 KG/M2 | HEART RATE: 59 BPM | WEIGHT: 154 LBS | DIASTOLIC BLOOD PRESSURE: 85 MMHG | HEIGHT: 65 IN | SYSTOLIC BLOOD PRESSURE: 134 MMHG

## 2025-08-04 DIAGNOSIS — M19.90 INFLAMMATORY ARTHROPATHY: ICD-10-CM

## 2025-08-04 DIAGNOSIS — M19.021 ARTHRITIS OF RIGHT ELBOW: Primary | ICD-10-CM

## 2025-08-04 DIAGNOSIS — M19.90 UNDIFFERENTIATED INFLAMMATORY ARTHRITIS: Primary | Chronic | ICD-10-CM

## 2025-08-04 DIAGNOSIS — R68.84 JAW PAIN: Chronic | ICD-10-CM

## 2025-08-04 DIAGNOSIS — R76.8 POSITIVE ANA (ANTINUCLEAR ANTIBODY): Chronic | ICD-10-CM

## 2025-08-04 DIAGNOSIS — M19.221 OTHER SECONDARY OSTEOARTHRITIS OF RIGHT ELBOW: Chronic | ICD-10-CM

## 2025-08-04 DIAGNOSIS — Z79.899 LONG-TERM USE OF HIGH-RISK MEDICATION: Chronic | ICD-10-CM

## 2025-08-04 PROCEDURE — 3079F DIAST BP 80-89 MM HG: CPT | Performed by: INTERNAL MEDICINE

## 2025-08-04 PROCEDURE — 3075F SYST BP GE 130 - 139MM HG: CPT | Performed by: INTERNAL MEDICINE

## 2025-08-04 PROCEDURE — 99214 OFFICE O/P EST MOD 30 MIN: CPT | Performed by: INTERNAL MEDICINE

## 2025-08-04 RX ORDER — BETAMETHASONE SODIUM PHOSPHATE AND BETAMETHASONE ACETATE 3; 3 MG/ML; MG/ML
12 INJECTION, SUSPENSION INTRA-ARTICULAR; INTRALESIONAL; INTRAMUSCULAR; SOFT TISSUE ONCE
Status: COMPLETED | OUTPATIENT
Start: 2025-08-04 | End: 2025-08-04

## 2025-08-04 RX ORDER — HYDROXYCHLOROQUINE SULFATE 200 MG/1
300 TABLET, FILM COATED ORAL DAILY
Qty: 135 TABLET | Refills: 1 | Status: SHIPPED | OUTPATIENT
Start: 2025-08-04 | End: 2026-02-04

## 2025-08-04 RX ADMIN — BETAMETHASONE SODIUM PHOSPHATE AND BETAMETHASONE ACETATE 12 MG: 3; 3 INJECTION, SUSPENSION INTRA-ARTICULAR; INTRALESIONAL; INTRAMUSCULAR; SOFT TISSUE at 11:10

## 2025-08-04 ASSESSMENT — ROUTINE ASSESSMENT OF PATIENT INDEX DATA (RAPID3)
ON A SCALE OF ONE TO TEN, HOW MUCH OF A PROBLEM HAS UNUSUAL FATIGUE OR TIREDNESS BEEN FOR YOU OVER THE PAST WEEK?: 1
WHEN YOU AWAKENED IN THE MORNING OVER THE LAST WEEK, PLEASE INDICATE THE AMOUNT OF TIME IT TAKES UNTIL YOU ARE AS LIMBER AS YOU WILL BE FOR THE DAY: < 10 MIN
ON A SCALE OF ONE TO TEN, HOW MUCH PAIN HAVE YOU HAD BECAUSE OF YOUR CONDITION OVER THE PAST WEEK?: 1
ON A SCALE OF ONE TO TEN, CONSIDERING ALL THE WAYS IN WHICH ILLNESS AND HEALTH CONDITIONS MAY AFFECT YOU AT THIS TIME, PLEASE INDICATE BELOW HOW YOU ARE DOING:: 1
ON A SCALE OF ONE TO TEN, HOW DIFFICULT WAS IT FOR YOU TO COMPLETE THE LISTED DAILY PHYSICAL TASKS OVER THE LAST WEEK: 0.0